# Patient Record
Sex: FEMALE | Race: WHITE | Employment: FULL TIME | URBAN - METROPOLITAN AREA
[De-identification: names, ages, dates, MRNs, and addresses within clinical notes are randomized per-mention and may not be internally consistent; named-entity substitution may affect disease eponyms.]

---

## 2023-01-28 ENCOUNTER — APPOINTMENT (OUTPATIENT)
Dept: ULTRASOUND IMAGING | Age: 43
DRG: 463 | End: 2023-01-28
Payer: COMMERCIAL

## 2023-01-28 ENCOUNTER — HOSPITAL ENCOUNTER (INPATIENT)
Age: 43
LOS: 1 days | Discharge: HOME | DRG: 463 | End: 2023-01-29
Attending: STUDENT IN AN ORGANIZED HEALTH CARE EDUCATION/TRAINING PROGRAM | Admitting: INTERNAL MEDICINE
Payer: COMMERCIAL

## 2023-01-28 ENCOUNTER — APPOINTMENT (OUTPATIENT)
Dept: CT IMAGING | Age: 43
DRG: 463 | End: 2023-01-28
Payer: COMMERCIAL

## 2023-01-28 DIAGNOSIS — N20.0 KIDNEY STONE: Primary | ICD-10-CM

## 2023-01-28 DIAGNOSIS — N12 PYELONEPHRITIS: ICD-10-CM

## 2023-01-28 LAB
A/G RATIO: 1.2 (ref 1.1–2.2)
ALBUMIN SERPL-MCNC: 4.1 G/DL (ref 3.4–5)
ALP BLD-CCNC: 90 U/L (ref 40–129)
ALT SERPL-CCNC: 9 U/L (ref 10–40)
AMPHETAMINE SCREEN, URINE: ABNORMAL
ANION GAP SERPL CALCULATED.3IONS-SCNC: 13 MMOL/L (ref 3–16)
AST SERPL-CCNC: 13 U/L (ref 15–37)
BACTERIA WET PREP: NORMAL
BACTERIA: ABNORMAL /HPF
BARBITURATE SCREEN URINE: ABNORMAL
BASOPHILS ABSOLUTE: 0.1 K/UL (ref 0–0.2)
BASOPHILS RELATIVE PERCENT: 0.9 %
BENZODIAZEPINE SCREEN, URINE: ABNORMAL
BILIRUB SERPL-MCNC: 0.4 MG/DL (ref 0–1)
BILIRUBIN URINE: NEGATIVE
BLOOD, URINE: ABNORMAL
BUN BLDV-MCNC: 10 MG/DL (ref 7–20)
CALCIUM SERPL-MCNC: 8.9 MG/DL (ref 8.3–10.6)
CANNABINOID SCREEN URINE: POSITIVE
CHLORIDE BLD-SCNC: 103 MMOL/L (ref 99–110)
CLARITY: CLEAR
CLUE CELLS: NORMAL
CO2: 20 MMOL/L (ref 21–32)
COCAINE METABOLITE SCREEN URINE: ABNORMAL
COLOR: YELLOW
CREAT SERPL-MCNC: 0.6 MG/DL (ref 0.6–1.1)
EOSINOPHILS ABSOLUTE: 0.3 K/UL (ref 0–0.6)
EOSINOPHILS RELATIVE PERCENT: 2.4 %
EPITHELIAL CELLS WET PREP: NORMAL
EPITHELIAL CELLS, UA: ABNORMAL /HPF (ref 0–5)
FENTANYL SCREEN, URINE: ABNORMAL
GFR SERPL CREATININE-BSD FRML MDRD: >60 ML/MIN/{1.73_M2}
GLUCOSE BLD-MCNC: 131 MG/DL (ref 70–99)
GLUCOSE URINE: NEGATIVE MG/DL
HCG QUALITATIVE: NEGATIVE
HCT VFR BLD CALC: 41 % (ref 36–48)
HEMOGLOBIN: 13.7 G/DL (ref 12–16)
INFLUENZA A: NOT DETECTED
INFLUENZA B: NOT DETECTED
KETONES, URINE: 15 MG/DL
LEUKOCYTE ESTERASE, URINE: ABNORMAL
LIPASE: 16 U/L (ref 13–60)
LYMPHOCYTES ABSOLUTE: 2.8 K/UL (ref 1–5.1)
LYMPHOCYTES RELATIVE PERCENT: 21.8 %
Lab: ABNORMAL
MAGNESIUM: 1.9 MG/DL (ref 1.8–2.4)
MCH RBC QN AUTO: 29.8 PG (ref 26–34)
MCHC RBC AUTO-ENTMCNC: 33.4 G/DL (ref 31–36)
MCV RBC AUTO: 89.2 FL (ref 80–100)
METHADONE SCREEN, URINE: ABNORMAL
MICROSCOPIC EXAMINATION: YES
MONOCYTES ABSOLUTE: 1 K/UL (ref 0–1.3)
MONOCYTES RELATIVE PERCENT: 8 %
NEUTROPHILS ABSOLUTE: 8.7 K/UL (ref 1.7–7.7)
NEUTROPHILS RELATIVE PERCENT: 66.9 %
NITRITE, URINE: POSITIVE
OPIATE SCREEN URINE: POSITIVE
OXYCODONE URINE: ABNORMAL
PDW BLD-RTO: 13.5 % (ref 12.4–15.4)
PH UA: 7.5
PH UA: 7.5 (ref 5–8)
PHENCYCLIDINE SCREEN URINE: ABNORMAL
PLATELET # BLD: 452 K/UL (ref 135–450)
PMV BLD AUTO: 7.9 FL (ref 5–10.5)
POTASSIUM REFLEX MAGNESIUM: 3.4 MMOL/L (ref 3.5–5.1)
PROTEIN UA: NEGATIVE MG/DL
RBC # BLD: 4.6 M/UL (ref 4–5.2)
RBC UA: ABNORMAL /HPF (ref 0–4)
RBC WET PREP: NORMAL
SARS-COV-2 RNA, RT PCR: NOT DETECTED
SODIUM BLD-SCNC: 136 MMOL/L (ref 136–145)
SOURCE WET PREP: NORMAL
SPECIFIC GRAVITY UA: 1.01 (ref 1–1.03)
TOTAL PROTEIN: 7.6 G/DL (ref 6.4–8.2)
TRICHOMONAS PREP: NORMAL
URINE REFLEX TO CULTURE: YES
URINE TYPE: ABNORMAL
UROBILINOGEN, URINE: 0.2 E.U./DL
WBC # BLD: 13 K/UL (ref 4–11)
WBC UA: ABNORMAL /HPF (ref 0–5)
WBC WET PREP: NORMAL
YEAST WET PREP: NORMAL

## 2023-01-28 PROCEDURE — 83690 ASSAY OF LIPASE: CPT

## 2023-01-28 PROCEDURE — 6360000004 HC RX CONTRAST MEDICATION: Performed by: PHYSICIAN ASSISTANT

## 2023-01-28 PROCEDURE — 87591 N.GONORRHOEAE DNA AMP PROB: CPT

## 2023-01-28 PROCEDURE — 87210 SMEAR WET MOUNT SALINE/INK: CPT

## 2023-01-28 PROCEDURE — 96375 TX/PRO/DX INJ NEW DRUG ADDON: CPT

## 2023-01-28 PROCEDURE — 83735 ASSAY OF MAGNESIUM: CPT

## 2023-01-28 PROCEDURE — 87086 URINE CULTURE/COLONY COUNT: CPT

## 2023-01-28 PROCEDURE — 76856 US EXAM PELVIC COMPLETE: CPT

## 2023-01-28 PROCEDURE — 81001 URINALYSIS AUTO W/SCOPE: CPT

## 2023-01-28 PROCEDURE — 87636 SARSCOV2 & INF A&B AMP PRB: CPT

## 2023-01-28 PROCEDURE — 6360000002 HC RX W HCPCS: Performed by: PHYSICIAN ASSISTANT

## 2023-01-28 PROCEDURE — 2580000003 HC RX 258: Performed by: PHYSICIAN ASSISTANT

## 2023-01-28 PROCEDURE — 85025 COMPLETE CBC W/AUTO DIFF WBC: CPT

## 2023-01-28 PROCEDURE — 80307 DRUG TEST PRSMV CHEM ANLYZR: CPT

## 2023-01-28 PROCEDURE — 1200000000 HC SEMI PRIVATE

## 2023-01-28 PROCEDURE — 99285 EMERGENCY DEPT VISIT HI MDM: CPT

## 2023-01-28 PROCEDURE — 2580000003 HC RX 258: Performed by: NURSE PRACTITIONER

## 2023-01-28 PROCEDURE — 74177 CT ABD & PELVIS W/CONTRAST: CPT

## 2023-01-28 PROCEDURE — 36415 COLL VENOUS BLD VENIPUNCTURE: CPT

## 2023-01-28 PROCEDURE — 87088 URINE BACTERIA CULTURE: CPT

## 2023-01-28 PROCEDURE — 6370000000 HC RX 637 (ALT 250 FOR IP): Performed by: PHYSICIAN ASSISTANT

## 2023-01-28 PROCEDURE — 96365 THER/PROPH/DIAG IV INF INIT: CPT

## 2023-01-28 PROCEDURE — 80053 COMPREHEN METABOLIC PANEL: CPT

## 2023-01-28 PROCEDURE — 84703 CHORIONIC GONADOTROPIN ASSAY: CPT

## 2023-01-28 PROCEDURE — 6360000002 HC RX W HCPCS: Performed by: NURSE PRACTITIONER

## 2023-01-28 PROCEDURE — 6370000000 HC RX 637 (ALT 250 FOR IP): Performed by: NURSE PRACTITIONER

## 2023-01-28 PROCEDURE — 87186 SC STD MICRODIL/AGAR DIL: CPT

## 2023-01-28 PROCEDURE — 87491 CHLMYD TRACH DNA AMP PROBE: CPT

## 2023-01-28 RX ORDER — POLYETHYLENE GLYCOL 3350 17 G/17G
17 POWDER, FOR SOLUTION ORAL DAILY PRN
Status: DISCONTINUED | OUTPATIENT
Start: 2023-01-28 | End: 2023-01-29 | Stop reason: HOSPADM

## 2023-01-28 RX ORDER — SODIUM CHLORIDE 9 MG/ML
INJECTION, SOLUTION INTRAVENOUS PRN
Status: DISCONTINUED | OUTPATIENT
Start: 2023-01-28 | End: 2023-01-29 | Stop reason: HOSPADM

## 2023-01-28 RX ORDER — POTASSIUM CHLORIDE 20 MEQ/1
40 TABLET, EXTENDED RELEASE ORAL ONCE
Status: COMPLETED | OUTPATIENT
Start: 2023-01-28 | End: 2023-01-28

## 2023-01-28 RX ORDER — SODIUM CHLORIDE 9 MG/ML
INJECTION, SOLUTION INTRAVENOUS CONTINUOUS
Status: DISCONTINUED | OUTPATIENT
Start: 2023-01-28 | End: 2023-01-29 | Stop reason: HOSPADM

## 2023-01-28 RX ORDER — ONDANSETRON 2 MG/ML
4 INJECTION INTRAMUSCULAR; INTRAVENOUS ONCE
Status: COMPLETED | OUTPATIENT
Start: 2023-01-28 | End: 2023-01-28

## 2023-01-28 RX ORDER — FLUOXETINE HYDROCHLORIDE 20 MG/1
40 CAPSULE ORAL DAILY
Status: DISCONTINUED | OUTPATIENT
Start: 2023-01-28 | End: 2023-01-29 | Stop reason: HOSPADM

## 2023-01-28 RX ORDER — TAMSULOSIN HYDROCHLORIDE 0.4 MG/1
0.4 CAPSULE ORAL DAILY
Status: DISCONTINUED | OUTPATIENT
Start: 2023-01-29 | End: 2023-01-29 | Stop reason: HOSPADM

## 2023-01-28 RX ORDER — KETOROLAC TROMETHAMINE 30 MG/ML
15 INJECTION, SOLUTION INTRAMUSCULAR; INTRAVENOUS ONCE
Status: COMPLETED | OUTPATIENT
Start: 2023-01-28 | End: 2023-01-28

## 2023-01-28 RX ORDER — SODIUM CHLORIDE 0.9 % (FLUSH) 0.9 %
5-40 SYRINGE (ML) INJECTION EVERY 12 HOURS SCHEDULED
Status: DISCONTINUED | OUTPATIENT
Start: 2023-01-28 | End: 2023-01-29 | Stop reason: HOSPADM

## 2023-01-28 RX ORDER — 0.9 % SODIUM CHLORIDE 0.9 %
500 INTRAVENOUS SOLUTION INTRAVENOUS ONCE
Status: COMPLETED | OUTPATIENT
Start: 2023-01-28 | End: 2023-01-28

## 2023-01-28 RX ORDER — ACETAMINOPHEN 650 MG/1
650 SUPPOSITORY RECTAL EVERY 6 HOURS PRN
Status: DISCONTINUED | OUTPATIENT
Start: 2023-01-28 | End: 2023-01-29 | Stop reason: HOSPADM

## 2023-01-28 RX ORDER — ENOXAPARIN SODIUM 100 MG/ML
40 INJECTION SUBCUTANEOUS DAILY
Status: DISCONTINUED | OUTPATIENT
Start: 2023-01-28 | End: 2023-01-29 | Stop reason: HOSPADM

## 2023-01-28 RX ORDER — ONDANSETRON 4 MG/1
4 TABLET, ORALLY DISINTEGRATING ORAL EVERY 8 HOURS PRN
Status: DISCONTINUED | OUTPATIENT
Start: 2023-01-28 | End: 2023-01-29 | Stop reason: HOSPADM

## 2023-01-28 RX ORDER — MORPHINE SULFATE 4 MG/ML
4 INJECTION, SOLUTION INTRAMUSCULAR; INTRAVENOUS ONCE
Status: COMPLETED | OUTPATIENT
Start: 2023-01-28 | End: 2023-01-28

## 2023-01-28 RX ORDER — FLUOXETINE HYDROCHLORIDE 40 MG/1
40 CAPSULE ORAL DAILY
COMMUNITY

## 2023-01-28 RX ORDER — ACETAMINOPHEN 325 MG/1
650 TABLET ORAL EVERY 6 HOURS PRN
Status: DISCONTINUED | OUTPATIENT
Start: 2023-01-28 | End: 2023-01-29 | Stop reason: HOSPADM

## 2023-01-28 RX ORDER — SODIUM CHLORIDE 0.9 % (FLUSH) 0.9 %
5-40 SYRINGE (ML) INJECTION PRN
Status: DISCONTINUED | OUTPATIENT
Start: 2023-01-28 | End: 2023-01-29 | Stop reason: HOSPADM

## 2023-01-28 RX ORDER — ONDANSETRON 2 MG/ML
4 INJECTION INTRAMUSCULAR; INTRAVENOUS EVERY 6 HOURS PRN
Status: DISCONTINUED | OUTPATIENT
Start: 2023-01-28 | End: 2023-01-29 | Stop reason: HOSPADM

## 2023-01-28 RX ORDER — PROCHLORPERAZINE EDISYLATE 5 MG/ML
10 INJECTION INTRAMUSCULAR; INTRAVENOUS ONCE
Status: DISCONTINUED | OUTPATIENT
Start: 2023-01-28 | End: 2023-01-28

## 2023-01-28 RX ORDER — DIPHENHYDRAMINE HYDROCHLORIDE 50 MG/ML
25 INJECTION INTRAMUSCULAR; INTRAVENOUS ONCE
Status: COMPLETED | OUTPATIENT
Start: 2023-01-28 | End: 2023-01-28

## 2023-01-28 RX ORDER — DROPERIDOL 2.5 MG/ML
0.62 INJECTION, SOLUTION INTRAMUSCULAR; INTRAVENOUS EVERY 6 HOURS PRN
Status: DISCONTINUED | OUTPATIENT
Start: 2023-01-28 | End: 2023-01-28

## 2023-01-28 RX ADMIN — KETOROLAC TROMETHAMINE 15 MG: 30 INJECTION, SOLUTION INTRAMUSCULAR; INTRAVENOUS at 14:39

## 2023-01-28 RX ADMIN — ENOXAPARIN SODIUM 40 MG: 100 INJECTION SUBCUTANEOUS at 18:19

## 2023-01-28 RX ADMIN — FLUOXETINE 40 MG: 20 CAPSULE ORAL at 18:19

## 2023-01-28 RX ADMIN — IOPAMIDOL 75 ML: 755 INJECTION, SOLUTION INTRAVENOUS at 12:46

## 2023-01-28 RX ADMIN — DIPHENHYDRAMINE HYDROCHLORIDE 25 MG: 50 INJECTION, SOLUTION INTRAMUSCULAR; INTRAVENOUS at 12:41

## 2023-01-28 RX ADMIN — MORPHINE SULFATE 4 MG: 4 INJECTION INTRAVENOUS at 11:25

## 2023-01-28 RX ADMIN — SODIUM CHLORIDE 500 ML: 9 INJECTION, SOLUTION INTRAVENOUS at 11:24

## 2023-01-28 RX ADMIN — CEFTRIAXONE SODIUM 1000 MG: 1 INJECTION, POWDER, FOR SOLUTION INTRAMUSCULAR; INTRAVENOUS at 13:52

## 2023-01-28 RX ADMIN — DROPERIDOL 0.62 MG: 2.5 INJECTION, SOLUTION INTRAMUSCULAR; INTRAVENOUS at 12:41

## 2023-01-28 RX ADMIN — POTASSIUM CHLORIDE 40 MEQ: 1500 TABLET, EXTENDED RELEASE ORAL at 12:12

## 2023-01-28 RX ADMIN — SODIUM CHLORIDE: 9 INJECTION, SOLUTION INTRAVENOUS at 18:19

## 2023-01-28 RX ADMIN — ONDANSETRON HYDROCHLORIDE 4 MG: 2 INJECTION, SOLUTION INTRAMUSCULAR; INTRAVENOUS at 11:25

## 2023-01-28 ASSESSMENT — PAIN SCALES - GENERAL
PAINLEVEL_OUTOF10: 0
PAINLEVEL_OUTOF10: 0
PAINLEVEL_OUTOF10: 10
PAINLEVEL_OUTOF10: 4

## 2023-01-28 ASSESSMENT — LIFESTYLE VARIABLES
HOW MANY STANDARD DRINKS CONTAINING ALCOHOL DO YOU HAVE ON A TYPICAL DAY: PATIENT DOES NOT DRINK
HOW OFTEN DO YOU HAVE A DRINK CONTAINING ALCOHOL: NEVER

## 2023-01-28 ASSESSMENT — ENCOUNTER SYMPTOMS
DIARRHEA: 1
RESPIRATORY NEGATIVE: 1
ABDOMINAL PAIN: 1

## 2023-01-28 ASSESSMENT — PAIN DESCRIPTION - LOCATION: LOCATION: ABDOMEN

## 2023-01-28 ASSESSMENT — PAIN - FUNCTIONAL ASSESSMENT: PAIN_FUNCTIONAL_ASSESSMENT: 0-10

## 2023-01-28 NOTE — ED NOTES
Dr. Reji Bang called back and spoke to [address] [facility],[address]  [birth year] [phone]

## 2023-01-28 NOTE — ED PROVIDER NOTES
I independently examined and evaluated Josee Avitia. I personally saw the patient and performed a substantive portion of the visit including all aspects of the medical decision making. In brief, this 56-year-old female presenting with 1 hour of left-sided abdominal/flank pain. Endorses nausea with several episodes of nonbloody nonbilious emesis. Denies any fevers or chills. Endorses loose stools. Has not noticed any dysuria or hematuria. .    Focused exam revealed   General: Alert, no acute distress, patient resting comfortably   Skin: warm, intact, no pallor noted   Head: Normocephalic, atraumatic   Eye: Normal conjunctiva   Cardiac: Normal peripheral perfusion  Respiratory: No acute distress   Musculoskeletal: No deformity, full ROM. Neurological: alert and oriented, normal sensory and motor observed. Psychiatric: Cooperative    ED course: Blood work shows mild leukocytosis and mild metabolic acidosis. Urinalysis does show a large amount of blood as well as nitrites. Patient treated with ceftriaxone. CT abdomen pelvis does show left-sided kidney stone associated with this urinary tract infection. Also shows possible epiploic appendagitis or diverticulitis. Patient discussed with urology and general surgery by the [facility]. Patient be admitted to hospital service for continued treatment. All diagnostic, treatment, and disposition decisions were made by myself in conjunction with the advanced practice provider. For all further details of the patient's emergency department visit, please see the advanced practice provider's documentation. Comment: Please note this report has been produced using speech recognition software and may contain errors related to that system including errors in grammar, punctuation, and spelling, as well as words and phrases that may be inappropriate. If there are any questions or concerns please feel free to contact the dictating provider for clarification. Brian Gardner, DO  01/28/23 1632

## 2023-01-28 NOTE — ED PROVIDER NOTES
[address] ED  EMERGENCY DEPARTMENT ENCOUNTER        Pt Name: Scooter Chow  MRN: [id number]  [place] 1980  Date of evaluation: 1/28/2023  Provider: Jenae Bradford PA-C  PCP: YONATAN Zapien CNP  Note Started: 11:03 AM EST 1/28/23       I have seen and evaluated this patient with my supervising physician Roena Kanner, [id number]  [address]       Chief Complaint   Patient presents with    Abdominal Pain     Sudden onset of left side abd pain. HISTORY OF PRESENT ILLNESS: 1 or more Elements     History From: patient      Scooter Chow is a 40 y.o. female with a past medical history of depression migraines hyperlipidemia vitamin D deficiency tobacco use obesity brought in today by EMS with complaints of left lower quadrant abdominal pain. Onset started 1 hour prior to arrival to the ED. Duration of symptoms have been persistent since onset. Context includes left lower quadrant pain. She denies fevers or chills. Denies nausea or vomiting but does endorse some watery loose stool. Denies any blood in the stool. Denies chest pain or shortness of breath. Denies urinary complaints. States she is on her menstrual cycle. Denies vaginal discharge or new sexual partners. Denies concern for STD. Denies sick contacts or recent abdominal surgeries. She has not taken anything for pain at this time. Denies any urinary complaints. Rates her current pain 10 out of 10 no radiation. No aggravating symptoms. No alleviating symptoms. Otherwise denies any other complaints. Nothing seems to make symptoms better or worse. He states she does smoke marijuana. Denies alcohol use. Nursing Notes were all reviewed and agreed with or any disagreements were addressed in the HPI. REVIEW OF SYSTEMS :      Review of Systems   Constitutional: Negative. HENT: Negative. Respiratory: Negative. Cardiovascular: Negative.     Gastrointestinal:  Positive for abdominal pain and diarrhea. Genitourinary: Negative. Musculoskeletal: Negative. Skin: Negative. Neurological: Negative. Positives and Pertinent negatives as per HPI. SURGICAL HISTORY     Past Surgical History:   Procedure Laterality Date    APPENDECTOMY  2007     SECTION  ,     WISDOM TOOTH EXTRACTION  2013       CURRENTMEDICATIONS       Previous Medications    BUPROPION SR (WELLBUTRIN SR) 150 MG SR TABLET    Take 1 tablet by mouth 2 times daily. CHANTIX CONTINUING MONTH JONATHAN 1 MG TABLET    Take 1 tablet by mouth 2 times daily. FLUOXETINE (PROZAC) 40 MG CAPSULE    Take 40 mg by mouth daily    VITAMIN D (ERGOCALCIFEROL) 97613 UNITS CAPS CAPSULE    Take 1 capsule by mouth twice a week. ALLERGIES     Patient has no known allergies. FAMILYHISTORY       Family History   Problem Relation Age of Onset    Depression Mother     High Blood Pressure Mother     Other Mother         Lupus    Other Mother         GERD    Anxiety Disorder Mother     Depression Brother 25    Alcohol Abuse Brother 25    Anxiety Disorder Brother     Arthritis Maternal Grandmother     Cancer Maternal Grandmother 62         of Pancreatic Cancer    Arthritis Maternal Grandfather     Lung Cancer Maternal Grandfather 72         of Lung Cancer    Alcohol Abuse Maternal Grandfather     Arthritis Paternal Grandmother     Arthritis Paternal Grandfather     Lung Cancer Paternal Grandfather 76         of Lung Cancer    Learning Disabilities Brother 5    Anxiety Disorder Father     Depression Father         SOCIAL HISTORY       Social History     Tobacco Use    Smoking status: Some Days     Packs/day: 0.00     Years: 5.00     Pack years: 0.00     Types: Cigarettes    Smokeless tobacco: Never    Tobacco comments:     Smoking 5 cigarettes per day.  Quit Date (14)   Substance Use Topics    Alcohol use: No     Comment: no    Drug use: No     Comment: never       SCREENINGS        Bybee Coma Scale  Eye Opening: Spontaneous  Best Verbal Response: Oriented  Best Motor Response: Obeys commands  Bryan Coma Scale Score: 15                CIWA Assessment  BP: 135/86  Heart Rate: 67           PHYSICAL EXAM  1 or more Elements     ED Triage Vitals   BP Temp Temp src Pulse Resp SpO2 Height Weight   -- -- -- -- -- -- -- --       Physical Exam  Vitals and nursing note reviewed. Constitutional:       General: She is awake. She is not in acute distress. Appearance: Normal appearance. She is well-developed. She is obese. She is not ill-appearing, toxic-appearing or diaphoretic. HENT:      Head: Normocephalic and atraumatic. Nose: Nose normal.   Eyes:      General:         Right eye: No discharge. Left eye: No discharge. Cardiovascular:      Rate and Rhythm: Normal rate and regular rhythm. Pulses:           Radial pulses are 2+ on the right side and 2+ on the left side. Heart sounds: Normal heart sounds. No murmur heard. No gallop. Pulmonary:      Effort: Pulmonary effort is normal. No respiratory distress. Breath sounds: Normal breath sounds. No decreased breath sounds, wheezing, rhonchi or rales. Chest:      Chest wall: No tenderness. Abdominal:      General: Abdomen is flat. Bowel sounds are normal.      Palpations: Abdomen is soft. Tenderness: There is abdominal tenderness in the left lower quadrant. There is guarding. There is no right CVA tenderness, left CVA tenderness or rebound. Negative signs include Cook's sign and McBurney's sign. Musculoskeletal:         General: No deformity. Normal range of motion. Cervical back: Normal range of motion and neck supple. Right lower leg: No edema. Left lower leg: No edema. Skin:     General: Skin is warm and dry. Neurological:      Mental Status: She is alert and oriented to person, place, and time. Psychiatric:         Behavior: Behavior normal. Behavior is cooperative.            DIAGNOSTIC RESULTS LABS:    Labs Reviewed   CBC WITH AUTO DIFFERENTIAL - Abnormal; Notable for the following components:       Result Value    WBC 13.0 (*)     Platelets 755 (*)     Neutrophils Absolute 8.7 (*)     All other components within normal limits   COMPREHENSIVE METABOLIC PANEL W/ REFLEX TO MG FOR LOW K - Abnormal; Notable for the following components:    Potassium reflex Magnesium 3.4 (*)     CO2 20 (*)     Glucose 131 (*)     ALT 9 (*)     AST 13 (*)     All other components within normal limits   URINALYSIS WITH REFLEX TO CULTURE - Abnormal; Notable for the following components:    Ketones, Urine 15 (*)     Blood, Urine LARGE (*)     Nitrite, Urine POSITIVE (*)     Leukocyte Esterase, Urine TRACE (*)     All other components within normal limits   URINE DRUG SCREEN - Abnormal; Notable for the following components:    Cannabinoid Scrn, Ur POSITIVE (*)     Opiate Scrn, Ur POSITIVE (*)     All other components within normal limits   MICROSCOPIC URINALYSIS - Abnormal; Notable for the following components:    WBC, UA 10-20 (*)     RBC, UA  (*)     Epithelial Cells, UA 11-20 (*)     Bacteria, UA 2+ (*)     All other components within normal limits   COVID-19 & INFLUENZA COMBO   WET PREP, GENITAL   C.TRACHOMATIS N.GONORRHOEAE DNA   CULTURE, URINE   LIPASE   HCG, SERUM, QUALITATIVE   MAGNESIUM       When ordered only abnormal lab results are displayed. All other labs were within normal range or not returned as of this dictation. EKG: When ordered, EKG's are interpreted by the Emergency Department Physician in the absence of a cardiologist.  Please see their note for interpretation of EKG.     RADIOLOGY:   Non-plain film images such as CT, Ultrasound and MRI are read by the radiologist. Plain radiographic images are visualized and preliminarily interpreted by the ED Provider with the below findings:        Interpretation per the Radiologist below, if available at the time of this note:    Sharon Additional Contrast? None   Preliminary Result   1. 3 mm calculus in the proximal left ureter with mild upstream   hydronephrosis. Subtle area of hypoenhancement in the lower pole of the left   kidney which could reflect an underlying cyst however focal pyelonephritis is   not excluded. 2. 9 mm fat-containing lesion in the left lower quadrant anterior to the   proximal sigmoid colon with surrounding inflammatory changes most suggestive   of epiploic appendagitis. Differential considerations include omental   infarct. Less likely consideration includes an acute uncomplicated   diverticulitis. 3. 2.0 cm dominant follicle in the left ovary. US PELVIS COMPLETE NON-OB TRANSABDOMINAL AND TRANSVAGINAL   Preliminary Result   Unremarkable uterus. Nonvisualization of the ovaries. No results found. No results found. PROCEDURES   Unless otherwise noted below, none     Procedures    CRITICAL CARE TIME (.cctime)       PAST MEDICAL HISTORY      has a past medical history of Depression, History of anemia (age 20's), Hyperlipidemia (9/13), Migraines, Overweight(278.02), and Vitamin D deficiency (9/13).      EMERGENCY DEPARTMENT COURSE and DIFFERENTIAL DIAGNOSIS/MDM:   Vitals:    Vitals:    01/28/23 1132 01/28/23 1307 01/28/23 1357 01/28/23 1358   BP: (!) 151/93  135/86    Pulse:    67   Resp:    16   Temp:       TempSrc:       SpO2: 100% 100% 100%    Weight:       Height:           Patient was given the following medications:  Medications   droperidol (INAPSINE) injection 0.625 mg (0.625 mg IntraVENous Given 1/28/23 1241)   cefTRIAXone (ROCEPHIN) 1,000 mg in sodium chloride 0.9 % 50 mL IVPB (mini-bag) (1,000 mg IntraVENous New Bag 1/28/23 1352)   ketorolac (TORADOL) injection 15 mg (has no administration in time range)   0.9 % sodium chloride bolus (0 mLs IntraVENous Stopped 1/28/23 1321)   morphine sulfate (PF) injection 4 mg (4 mg IntraVENous Given 1/28/23 1125)   ondansetron (ZOFRAN) injection 4 mg (4 mg IntraVENous Given 1/28/23 1125)   potassium chloride (KLOR-CON M) extended release tablet 40 mEq (40 mEq Oral Given 1/28/23 1212)   diphenhydrAMINE (BENADRYL) injection 25 mg (25 mg IntraVENous Given 1/28/23 1241)   iopamidol (ISOVUE-370) 76 % injection 75 mL (75 mLs IntraVENous Given 1/28/23 1246)             Is this patient to be included in the SEP-1 Core Measure due to severe sepsis or septic shock? No   Exclusion criteria - the patient is NOT to be included for SEP-1 Core Measure due to:  May have criteria for sepsis, but does not meet criteria for severe sepsis or septic shock    Chronic Conditions affecting care:    has a past medical history of Depression, History of anemia (age 20's), Hyperlipidemia (9/13), Migraines, Overweight(278.02), and Vitamin D deficiency (9/13). CONSULTS: (Who and What was discussed)  IP CONSULT TO UROLOGY to discuss admission, Dr. Carmen Cabrera to discuss CT scan results         Records Reviewed (Source): NONE    CC/HPI Summary, DDx, ED Course, and Reassessment:     Patient brought in today by EMS with complaints of left lower quadrant abdominal pain. On exam she appears uncomfortable and in pain. She is alert oriented afebrile breathing on room air satting at 100%. Nontoxic. No acute respiratory distress. Old labs records reviewed. Patient seen and evaluated by myself as well as my attending Dr. Nano Mccray. Patient given IV fluids, IV morphine and IV Zofran. CBC shows white count of 13. Hemoglobin 13.7. Elevated platelets to 787. Potassium of 3.4. Will supplement with p.o. potassium. Kidney function liver function unremarkable. Lipase of 16. HGG qualitative is negative. COVID and FLU negative. Patient received p.o. potassium. No other electrolyte abnormalities. Ultrasound of the pelvis which is read and interpreted by radiology reveals unremarkable uterus nonvisualization of the ovaries.     Urine positive nitrites trace leukocytes 15 ketones and a 20 WBCs with +2 bacteria. We will plan to treat for UTI. Will give IV Rocephin. Wet  Prep negative. Gonorrhea and chlamydia currently pending. CT abdomen and pelvis shows a 3 mm calculus in the proximal left ureter with mild upstream hydronephrosis subtle area of hypoenhancement in the lower pole of the left kidney which could reflect an underlying cyst however focal pyelonephritis is not excluded. 9 mm fat-containing lesion in the left lower quadrant anterior to the proximal sigmoid colon with surrounding inflammatory changes no suggestive of epileptic appendagitis differentials include omental infarct less likely consideration includes an acute uncomplicated diverticulitis 2 cm dominant follicle in the left ovary. Patient was updated on all findings. Plan at this time will be to admit. We will consult urology and general surgery as well as the hospitalist.    I spoke to general surgery at the request of the hospitalist in regards to CT findings. I spoke to Dr. Waylon Pacheco in regards to the  epiploic appendagitis did not feel as though this was a surgical finding. In addition we will consult urology. Disposition Considerations (tests considered but not done, Admit vs D/C, Shared Decision Making, Pt Expectation of Test or Tx.):     Plan at this time will be to admit for infected kidney stone. I spoke to Neva Kayser APP with the hospitalist team who did accept this admission. Patient stable at time of admission. I am the Primary Clinician of Record. FINAL IMPRESSION      1. Kidney stone    2. Pyelonephritis          DISPOSITION/PLAN     DISPOSITION Decision To Admit 01/28/2023 02:07:49 PM      PATIENT REFERRED TO:  No follow-up provider specified.     DISCHARGE MEDICATIONS:  New Prescriptions    No medications on file       DISCONTINUED MEDICATIONS:  Discontinued Medications    No medications on file              (Please note that portions of this note were completed with a voice recognition program.  Efforts were made to edit the dictations but occasionally words are mis-transcribed.)    David Shin PA-C (electronically signed)        David Shin PA-C  01/28/23 4110

## 2023-01-28 NOTE — PROGRESS NOTES
Admit to med-surg  3 mm ureteral stone  Pyelonephritis    Gavi Schwartz [facility]  1/28/2023

## 2023-01-28 NOTE — ED NOTES
Writer gave report to [employer], who assumes care at this time. Abebe Morrison RN will transport pt to PCU via wheelchair.       Zaki Gar RN  01/28/23 0746

## 2023-01-29 VITALS
TEMPERATURE: 98 F | HEART RATE: 81 BPM | SYSTOLIC BLOOD PRESSURE: 122 MMHG | BODY MASS INDEX: 30.23 KG/M2 | WEIGHT: 199.5 LBS | DIASTOLIC BLOOD PRESSURE: 74 MMHG | RESPIRATION RATE: 18 BRPM | HEIGHT: 68 IN | OXYGEN SATURATION: 94 %

## 2023-01-29 PROBLEM — K63.89 EPIPLOIC APPENDAGITIS: Status: ACTIVE | Noted: 2023-01-29

## 2023-01-29 PROBLEM — D72.829 LEUCOCYTOSIS: Status: ACTIVE | Noted: 2023-01-29

## 2023-01-29 PROBLEM — E87.6 HYPOKALEMIA: Status: ACTIVE | Noted: 2023-01-29

## 2023-01-29 LAB
A/G RATIO: 1.2 (ref 1.1–2.2)
ALBUMIN SERPL-MCNC: 3.4 G/DL (ref 3.4–5)
ALP BLD-CCNC: 85 U/L (ref 40–129)
ALT SERPL-CCNC: 13 U/L (ref 10–40)
ANION GAP SERPL CALCULATED.3IONS-SCNC: 9 MMOL/L (ref 3–16)
AST SERPL-CCNC: 11 U/L (ref 15–37)
BASOPHILS ABSOLUTE: 0 K/UL (ref 0–0.2)
BASOPHILS RELATIVE PERCENT: 0.4 %
BILIRUB SERPL-MCNC: 0.4 MG/DL (ref 0–1)
BUN BLDV-MCNC: 11 MG/DL (ref 7–20)
CALCIUM SERPL-MCNC: 8 MG/DL (ref 8.3–10.6)
CHLORIDE BLD-SCNC: 107 MMOL/L (ref 99–110)
CO2: 21 MMOL/L (ref 21–32)
CREAT SERPL-MCNC: 0.6 MG/DL (ref 0.6–1.1)
EOSINOPHILS ABSOLUTE: 0.2 K/UL (ref 0–0.6)
EOSINOPHILS RELATIVE PERCENT: 2.2 %
GFR SERPL CREATININE-BSD FRML MDRD: >60 ML/MIN/{1.73_M2}
GLUCOSE BLD-MCNC: 96 MG/DL (ref 70–99)
HCT VFR BLD CALC: 35.2 % (ref 36–48)
HEMOGLOBIN: 12.1 G/DL (ref 12–16)
LYMPHOCYTES ABSOLUTE: 2.2 K/UL (ref 1–5.1)
LYMPHOCYTES RELATIVE PERCENT: 20.6 %
MCH RBC QN AUTO: 29.9 PG (ref 26–34)
MCHC RBC AUTO-ENTMCNC: 34.3 G/DL (ref 31–36)
MCV RBC AUTO: 87.2 FL (ref 80–100)
MONOCYTES ABSOLUTE: 1 K/UL (ref 0–1.3)
MONOCYTES RELATIVE PERCENT: 9.3 %
NEUTROPHILS ABSOLUTE: 7.1 K/UL (ref 1.7–7.7)
NEUTROPHILS RELATIVE PERCENT: 67.5 %
PDW BLD-RTO: 13.3 % (ref 12.4–15.4)
PLATELET # BLD: 374 K/UL (ref 135–450)
PMV BLD AUTO: 7.9 FL (ref 5–10.5)
POTASSIUM REFLEX MAGNESIUM: 3.8 MMOL/L (ref 3.5–5.1)
RBC # BLD: 4.04 M/UL (ref 4–5.2)
SODIUM BLD-SCNC: 137 MMOL/L (ref 136–145)
TOTAL PROTEIN: 6.2 G/DL (ref 6.4–8.2)
WBC # BLD: 10.5 K/UL (ref 4–11)

## 2023-01-29 PROCEDURE — 6360000002 HC RX W HCPCS: Performed by: NURSE PRACTITIONER

## 2023-01-29 PROCEDURE — 2580000003 HC RX 258: Performed by: NURSE PRACTITIONER

## 2023-01-29 PROCEDURE — 6370000000 HC RX 637 (ALT 250 FOR IP): Performed by: UROLOGY

## 2023-01-29 PROCEDURE — 85025 COMPLETE CBC W/AUTO DIFF WBC: CPT

## 2023-01-29 PROCEDURE — 80053 COMPREHEN METABOLIC PANEL: CPT

## 2023-01-29 PROCEDURE — 36415 COLL VENOUS BLD VENIPUNCTURE: CPT

## 2023-01-29 PROCEDURE — 6370000000 HC RX 637 (ALT 250 FOR IP): Performed by: NURSE PRACTITIONER

## 2023-01-29 PROCEDURE — 99238 HOSP IP/OBS DSCHRG MGMT 30/<: CPT | Performed by: INTERNAL MEDICINE

## 2023-01-29 RX ORDER — TAMSULOSIN HYDROCHLORIDE 0.4 MG/1
0.4 CAPSULE ORAL DAILY
Qty: 30 CAPSULE | Refills: 3 | Status: SHIPPED | OUTPATIENT
Start: 2023-01-30

## 2023-01-29 RX ORDER — CEFUROXIME AXETIL 500 MG/1
500 TABLET ORAL 2 TIMES DAILY
Qty: 14 TABLET | Refills: 0 | Status: SHIPPED | OUTPATIENT
Start: 2023-01-29 | End: 2023-02-05

## 2023-01-29 RX ADMIN — TAMSULOSIN HYDROCHLORIDE 0.4 MG: 0.4 CAPSULE ORAL at 00:22

## 2023-01-29 RX ADMIN — FLUOXETINE 40 MG: 20 CAPSULE ORAL at 13:24

## 2023-01-29 RX ADMIN — SODIUM CHLORIDE, PRESERVATIVE FREE 10 ML: 5 INJECTION INTRAVENOUS at 13:34

## 2023-01-29 RX ADMIN — CEFTRIAXONE SODIUM 1000 MG: 1 INJECTION, POWDER, FOR SOLUTION INTRAMUSCULAR; INTRAVENOUS at 15:06

## 2023-01-29 RX ADMIN — SODIUM CHLORIDE: 9 INJECTION, SOLUTION INTRAVENOUS at 06:09

## 2023-01-29 ASSESSMENT — PAIN SCALES - GENERAL
PAINLEVEL_OUTOF10: 0

## 2023-01-29 NOTE — FLOWSHEET NOTE
01/28/23 1952   Vital Signs   Temp 98.1 °F (36.7 °C)   Temp Source Oral   Heart Rate 69   Heart Rate Source Monitor   Resp 16   /74   MAP (Calculated) 87   BP Location Left upper arm   Level of Consciousness 0   MEWS Score 1   Oxygen Therapy   SpO2 99 %   O2 Device None (Room air)   Pt assessment complete. Pt lying in bed quietly. No s/s of distress noted. Lung sounds clear. Pt denies any pain at this time. IV fluids infusing at 75ml/hr. Denies needs. Call light within reach.

## 2023-01-29 NOTE — CONSULTS
Patient:  Bill Schuler  YOB: 1980   CSN:  [id number]    Primary Care Provider:  Marylee Newer, APRN - CNP       Urology Attending Consult Note     Reason for Consultation:  nephrolithiasis, flank pain    Chief Complaint: \"my side hurt\"    HPI:  Rebecca Henry is a 40 y.o. female who presented with 1 day history of severe renal colic which prompted visit to the ER. CT showed a 3mm stone,  right upper ureter. No fevers but possible cystitis. He feels well now and prefers to go home to try to pass the stone.      Past Medical History:   Diagnosis Date    Depression     History of anemia age 20's    Hyperlipidemia 2013    Kidney stone 2023    Migraines     Overweight(278.02)     weight is 188 lbs (), goal = 159 lbs    Vitamin D deficiency 2013       Past Surgical History:   Procedure Laterality Date    APPENDECTOMY  2007     SECTION  ,     WISDOM TOOTH EXTRACTION  2013       Medication List reviewed:     Current Facility-Administered Medications   Medication Dose Route Frequency Provider Last Rate Last Admin    FLUoxetine (PROZAC) capsule 40 mg  40 mg Oral Daily YONATAN Sifuentes - CNP   40 mg at 23 1324    sodium chloride flush 0.9 % injection 5-40 mL  5-40 mL IntraVENous 2 times per day YONATAN Sifuentes CNP   10 mL at 23 1334    sodium chloride flush 0.9 % injection 5-40 mL  5-40 mL IntraVENous PRN YONATAN Sifuentes - CNP        0.9 % sodium chloride infusion   IntraVENous PRN YONATAN Sifuentes CNP        enoxaparin (LOVENOX) injection 40 mg  40 mg SubCUTAneous Daily YONATAN Sifuentes - CNP   40 mg at 23 181    ondansetron (ZOFRAN-ODT) disintegrating tablet 4 mg  4 mg Oral Q8H PRN YONATAN Sifuentes CNP        Or    ondansetron (ZOFRAN) injection 4 mg  4 mg IntraVENous Q6H PRN YONATAN Sifuentes CNP        polyethylene glycol (GLYCOLAX) packet 17 g  17 g Oral Daily PRN YONATAN Sifuentes CNP        acetaminophen (TYLENOL) tablet 650 mg  650 mg Oral Q6H PRN Buzzy Cornea, APRN - CNP        Or    acetaminophen (TYLENOL) suppository 650 mg  650 mg Rectal Q6H PRN Buzzy Cornea, APRN - CNP        0.9 % sodium chloride infusion   IntraVENous Continuous Buzzy Cornea, APRN - CNP 75 mL/hr at 23 4793 New Bag at 23 0609    cefTRIAXone (ROCEPHIN) 1,000 mg in sodium chloride 0.9 % 50 mL IVPB (mini-bag)  1,000 mg IntraVENous Q24H Buzzy Cornea, APRN - CNP   Stopped at 23 1639    HYDROmorphone (DILAUDID) injection 0.5 mg  0.5 mg IntraVENous Q4H PRN Buzzy Cornea, APRN - CNP        tamsulosin [facility]) capsule 0.4 mg  0.4 mg Oral Daily Manasa Wu MD   0.4 mg at 23 0022       No Known Allergies    Family History   Problem Relation Age of Onset    Depression Mother     High Blood Pressure Mother     Other Mother         Lupus    Other Mother         GERD    Anxiety Disorder Mother     Depression Brother 25    Alcohol Abuse Brother 25    Anxiety Disorder Brother     Arthritis Maternal Grandmother     Cancer Maternal Grandmother 62         of Pancreatic Cancer    Arthritis Maternal Grandfather     Lung Cancer Maternal Grandfather 72         of Lung Cancer    Alcohol Abuse Maternal Grandfather     Arthritis Paternal Grandmother     Arthritis Paternal Grandfather     Lung Cancer Paternal Grandfather 76         of Lung Cancer    Learning Disabilities Brother 5    Anxiety Disorder Father     Depression Father        Social History     Tobacco Use    Smoking status: Some Days     Packs/day: 0.00     Years: 5.00     Pack years: 0.00     Types: Cigarettes    Smokeless tobacco: Never    Tobacco comments:     Smoking 5 cigarettes per day. Quit Date (14)   Substance Use Topics    Alcohol use: No     Comment: no    Drug use: No     Comment: never         Review of Systems: A 12 point ROS was performed and was unremarkable unless listed in the history of present illness. I/O last 3 completed shifts:   In: 904.1 [I.V.:854.7; IV Piggyback:49.4]  Out: 200 [Urine:200]    Physical Exam:  Patient Vitals for the past 8 hrs:   BP Temp Temp src Pulse Resp SpO2   01/29/23 1315 122/74 98 °F (36.7 °C) Oral 81 18 94 %     Constitutional: pleasant patient in NAD, with a normal body habitus   EENT: pink conjunctivae, lips without cyanosis, normal appearance of ears and nose  Neck: no masses or lesions, trachea midline   Respiratory: normal respiratory movements without distress   Cardiovascular: regular rate and rhythm, lower extremities without edema   Abdomen: soft, NTND, no masses, no guarding  Back: stable,  no abnormal curvature of the spine  Skin: warm and dry, no rashes, lesions, or ulcers  Musculoskeletal: normal ROM, m. tone, no digital cyanosis, head normocephalic  Psych: normal mood and affect, alert and appropriately answers questions  : stable bladder, no urethral catheter    Labs:     No results found for: PSA  No results found for: PSA, PSADIA  Recent Labs     01/29/23  0451 01/28/23  1115   WBC 10.5 13.0*   HGB 12.1 13.7   HCT 35.2* 41.0   MCV 87.2 89.2    452*     No results found for: LABA1C  Lab Results   Component Value Date    LABMICR YES 01/28/2023    LDLCALC 134 (H) 09/11/2013    CREATININE 0.6 01/29/2023     Lab Results   Component Value Date     01/29/2023    K 3.8 01/29/2023     01/29/2023    CO2 21 01/29/2023    BUN 11 01/29/2023    CREATININE 0.6 01/29/2023    GLUCOSE 96 01/29/2023    CALCIUM 8.0 (L) 01/29/2023    PROT 6.2 (L) 01/29/2023    LABALBU 3.4 01/29/2023    BILITOT 0.4 01/29/2023    ALKPHOS 85 01/29/2023    AST 11 (L) 01/29/2023    ALT 13 01/29/2023    LABGLOM >60 01/29/2023    GFRAA >60 09/11/2013    AGRATIO 1.2 01/29/2023    GLOB 4 09/11/2013     Lab Results   Component Value Date    CREATININE 0.6 01/29/2023    CREATININE 0.6 01/28/2023    CREATININE 0.7 09/11/2013       Lab Results   Component Value Date/Time    APPEARANCE clear 06/09/2012 04:06 PM    COLORU Yellow 01/28/2023 01:16 PM    NITRU POSITIVE 01/28/2023 01:16 PM    GLUCOSEU Negative 01/28/2023 01:16 PM    GLUCOSEU NEGATIVE 03/07/2011 09:45 AM    KETUA 15 01/28/2023 01:16 PM    UROBILINOGEN 0.2 01/28/2023 01:16 PM    BILIRUBINUR Negative 01/28/2023 01:16 PM    BILIRUBINUR negative 06/09/2012 04:06 PM    BILIRUBINUR NEGATIVE 03/07/2011 09:45 AM     Radiology: \"Imaging was independently reviewed by myself and I agree with the radiology interpretation except as noted above\"  3mm ureteral stone.      Assessment:  Right hydronephrosis secondary to obstruction from ureteral calculi, as described above  Flank pain and nausea    Plan:   - trial of stone passage  - call asap if fever/chills change in symptoms or return to ER  - oral hydration, 3L of fluid per day  - flomax 0.4mg qday - can help pass stone and help with stone related pain  - Prn toradol and narcotics for pain  -cipro abx on dc     Misty Carver MD  Office: [phone]

## 2023-01-29 NOTE — DISCHARGE SUMMARY
Name:  Damian Borges  Room:  /2812-84  MRN:    [id number]    Discharge Summary      This discharge summary is in conjunction with a complete physical exam done on the day of discharge. Discharging Physician: Trisha Mulligan MD      Admit: 1/28/2023  Discharge:  1/29/2023     Diagnoses this Admission    Principal Problem:    Kidney stone  Active Problems:    Hypokalemia    Leucocytosis    Epiploic appendagitis    Hyperlipidemia  Resolved Problems:    * No resolved hospital problems. *      Procedures (Please Review Full Report for Details)  none    Consults    IP CONSULT TO UROLOGY  IP CONSULT TO UROLOGY      HPI:    40 y.o. female who presented to the hospital with a chief complaint of acute onset left-sided abdominal pain. The pain was a 10 out of 10 and was radiating down to her groin. She presents to the emergency department as the pain persisted. In the emergency department a CT scan that was obtained revealed a 3 mm stone with hydronephrosis on the left. She will be admitted for urology evaluation in the a.m. Physical Exam at Discharge:  /74   Pulse 81   Temp 98 °F (36.7 °C) (Oral)   Resp 18   Ht 5' 8\" (1.727 m)   Wt 199 lb 8 oz (90.5 kg)   SpO2 94%   BMI 30.33 kg/m²     See progress note      Hospital Course    Ureteral calculus  -3 mm proximal left ureter with mild hydronephrosis  -Pyelonephritis   -admitted for further management.  -urology consulted  -NPO, IVF's, started diet. Send home with Ceftin and strainer. Urology follow up. Urology has cleared patient for discharge.  -added Flomax  -pain control: Toradol, Dilaudid  -antiemetics prn  -Rocephin D#2  -urine cx- E coli. Discharge with Ceftin.      Possible epiploic appendagitis  -discussed with general surgery  -no pain     Hypokalemia  -improved     Leukocytosis  -due to above  -improved with IVF's, antibiotics     Depression  -cont Prozac     Tobacco Dependence  -Recommended cessation       CBC:   Recent Labs 01/28/23  1115 01/29/23  0451   WBC 13.0* 10.5   HGB 13.7 12.1   HCT 41.0 35.2*   MCV 89.2 87.2   * 374     BMP:   Recent Labs     01/28/23  1115 01/29/23  0451    137   K 3.4* 3.8    107   CO2 20* 21   BUN 10 11   CREATININE 0.6 0.6     LIVER PROFILE:   Recent Labs     01/28/23  1115 01/29/23  0451   AST 13* 11*   ALT 9* 13   LIPASE 16.0  --    BILITOT 0.4 0.4   ALKPHOS 90 85     PT/INR: No results for input(s): PROTIME, INR in the last 72 hours. APTT: No results for input(s): APTT in the last 72 hours. UA:  Recent Labs     01/28/23  1316   COLORU Yellow   PHUR 7.5  7.5   WBCUA 10-20*   RBCUA *   BACTERIA 2+*   CLARITYU Clear   SPECGRAV 1.010   LEUKOCYTESUR TRACE*   UROBILINOGEN 0.2   BILIRUBINUR Negative   BLOODU LARGE*   GLUCOSEU Negative         CT ABDOMEN PELVIS W IV CONTRAST Additional Contrast? None   Preliminary Result   1. 3 mm calculus in the proximal left ureter with mild upstream   hydronephrosis. Subtle area of hypoenhancement in the lower pole of the left   kidney which could reflect an underlying cyst however focal pyelonephritis is   not excluded. 2. 9 mm fat-containing lesion in the left lower quadrant anterior to the   proximal sigmoid colon with surrounding inflammatory changes most suggestive   of epiploic appendagitis. Differential considerations include omental   infarct. Less likely consideration includes an acute uncomplicated   diverticulitis. 3. 2.0 cm dominant follicle in the left ovary. US PELVIS COMPLETE NON-OB TRANSABDOMINAL AND TRANSVAGINAL   Preliminary Result   Unremarkable uterus. Nonvisualization of the ovaries.                 Discharge Medications     Medication List        START taking these medications      cefUROXime 500 MG tablet  Commonly known as: CEFTIN  Take 1 tablet by mouth 2 times daily for 7 days     tamsulosin 0.4 MG capsule  Commonly known as: FLOMAX  Take 1 capsule by mouth daily  Start taking on: January 30, 2023 CONTINUE taking these medications      FLUoxetine 40 MG capsule  Commonly known as: PROzac     vitamin D 1.25 MG (28055 UT) Caps capsule  Commonly known as: ERGOCALCIFEROL  Take 1 capsule by mouth twice a week. Where to Get Your Medications        These medications were sent to [facility] [id number] [facility], [address] [place] [address], [address] [place] [address]      Phone: [phone]   cefUROXime 500 MG tablet  tamsulosin 0.4 MG capsule           Discharge Condition/Location: Stable    Follow Up: Follow up with PCP.         Rohan Magaña MD 1/29/2023 4:52 PM

## 2023-01-29 NOTE — DISCHARGE INSTRUCTIONS
Kidney Stone: Care Instructions  Your Care Instructions     Kidney stones are formed when salts, minerals, and other substances normally found in the urine clump together. They can be as small as grains of sand or, rarely, as large as golf balls. While the stone is traveling through the ureter, which is the tube that carries urine from the kidney to the bladder, you will probably feel pain. The pain may be mild or very severe. You may also have some blood in your urine. As soon as the stone reaches the bladder, any intense pain should go away. If a stone is too large to pass on its own, you may need a medical procedure to help you pass the stone. The doctor has checked you carefully, but problems can develop later. If you notice any problems or new symptoms, get medical treatment right away. Follow-up care is a key part of your treatment and safety. Be sure to make and go to all appointments, and call your doctor if you are having problems. It's also a good idea to know your test results and keep a list of the medicines you take. How can you care for yourself at home? Drink plenty of fluids. If you have kidney, heart, or liver disease and have to limit fluids, talk with your doctor before you increase the amount of fluids you drink. Take pain medicines exactly as directed. Call your doctor if you think you are having a problem with your medicine. If the doctor gave you a prescription medicine for pain, take it as prescribed. If you are not taking a prescription pain medicine, ask your doctor if you can take an over-the-counter medicine. Read and follow all instructions on the label. Your doctor may ask you to strain your urine so that you can collect your kidney stone when it passes. You can use a kitchen strainer or a tea strainer to catch the stone. Store it in a plastic bag until you see your doctor again. Preventing future kidney stones  Some changes in your diet may help prevent kidney stones. Depending on the cause of your stones, your doctor may recommend that you:  Drink plenty of fluids. If you have kidney, heart, or liver disease and have to limit fluids, talk with your doctor before you increase the amount of fluids you drink. Limit coffee, tea, and alcohol. Also avoid grapefruit juice. Do not take more than the recommended daily dose of vitamins C and D. Avoid antacids such as Gaviscon, Maalox, Mylanta, or Tums. Limit the amount of salt (sodium) in your diet. Eat a balanced diet that is not too high in protein. Limit foods that are high in a substance called oxalate, which can cause kidney stones. These foods include dark green vegetables, rhubarb, chocolate, wheat bran, nuts, cranberries, and beans. When should you call for help? Call your doctor now or seek immediate medical care if:    You cannot keep down fluids. Your pain gets worse. You have a fever or chills. You have new or worse pain in your back just below your rib cage (the flank area). You have new or more blood in your urine. Watch closely for changes in your health, and be sure to contact your doctor if:    You do not get better as expected. Where can you learn more? Go to http://www.woods.com/ and enter H042 to learn more about \"Kidney Stone: Care Instructions. \"  Current as of: June 16, 2022               Content Version: 13.5  © 2006-2022 [employer]. Care instructions adapted under license by [facility] ([facility]). If you have questions about a medical condition or this instruction, always ask your healthcare professional. [address] any warranty or liability for your use of this information.

## 2023-01-29 NOTE — PROGRESS NOTES
DC instructions reviewed with a verbal understanding. Patient was sent home with a urine strainer and measuring device for toilet. Patients iv removed with no complications. Patient refused wheelchair and walked out of facility with a steady gait. Patient denied any needs prior to leaving.

## 2023-01-29 NOTE — PROGRESS NOTES
Patient received to PCU in stable condition and denies any needs at this time. Patient has been oriented to room and call light.

## 2023-01-29 NOTE — PROGRESS NOTES
4 Eyes Skin Assessment     The patient is being assess for   Admission    I agree that 2 RN's have performed a thorough Head to Toe Skin Assessment on the patient. ALL assessment sites listed below have been assessed. Areas assessed for pressure by both nurses:   [x]   Head, Face, and Ears   [x]   Shoulders, Back, and Chest, Abdomen  [x]   Arms, Elbows, and Hands   [x]   Coccyx, Sacrum, and Ischium  [x]   Legs, Feet, and Heels        Patients skin is unremarkable. Skin Assessed Under all Medical Devices by both nurses:  none               All Mepilex Borders were peeled back and area peeked at by both nurses:  Yes  Please list where Mepilex Borders are located:  n/a             **SHARE this note so that the co-signing nurse is able to place an eSignature**    Co-signer eSignature: Electronically signed by Sergei Lovelace RN on 1/28/23 at 7:49 PM EST    Does the Patient have Skin Breakdown related to pressure?   No              Colin Prevention initiated:  No   Wound Care Orders initiated:  No      [facility] nurse consulted for Pressure Injury (Stage 3,4, Unstageable, DTI, NWPT, Complex wounds)and New or Established Ostomies:  No      Primary Nurse eSignature: Electronically signed by Jana La RN on 1/28/23 at 7:35 PM EST

## 2023-01-29 NOTE — PROGRESS NOTES
Progress Note    Admit Date:  1/28/2023    40year old female presented with c/o left flank pain, radiating to his groin. Found to have 3 mm stone left proximal ureter with hydronephrosis. Admitted for further management/care. Urology consulted. Subjective:  Ms. Ciro Toledo is denying any pain. No dysuria. No fever or chills. Objective:   Patient Vitals for the past 4 hrs:   BP Temp Temp src Pulse Resp SpO2   01/29/23 0737 114/75 97.8 °F (36.6 °C) Oral 85 16 96 %          Intake/Output Summary (Last 24 hours) at 1/29/2023 0826  Last data filed at 1/29/2023 5325  Gross per 24 hour   Intake 904.08 ml   Output 200 ml   Net 704.08 ml       Physical Exam:  Gen: No distress. Alert. Eyes: PERRL. No sclera icterus. No conjunctival injection. ENT: No discharge. Pharynx clear. Neck:  Trachea midline. Resp: No accessory muscle use. No crackles. No wheezes. No rhonchi. CV: Regular rate. Regular rhythm. No murmur. No rub. No edema. Capillary Refill: Brisk,< 3 seconds   Peripheral Pulses: +2 palpable, equal bilaterally   GI: Non-tender. Non-distended. Normal bowel sounds. No hernia. Left flank pain  Skin: Warm and dry. No nodule on exposed extremities. No rash on exposed extremities. M/S: No cyanosis. No joint deformity. No clubbing. Neuro: Awake. Grossly nonfocal    Psych: Oriented x 3. No anxiety or agitation      Data:  CBC:   Recent Labs     01/28/23  1115 01/29/23  0451   WBC 13.0* 10.5   HGB 13.7 12.1   HCT 41.0 35.2*   MCV 89.2 87.2   * 374     BMP:   Recent Labs     01/28/23  1115 01/29/23  0451    137   K 3.4* 3.8    107   CO2 20* 21   BUN 10 11   CREATININE 0.6 0.6     LIVER PROFILE:   Recent Labs     01/28/23  1115 01/29/23  0451   AST 13* 11*   ALT 9* 13   LIPASE 16.0  --    BILITOT 0.4 0.4   ALKPHOS 90 85     PT/INR: No results for input(s): PROTIME, INR in the last 72 hours.     CULTURES  Urine: pending  COVID/flu: negative  Wet prep: negative  Chlamydia/Gonorrhea: pending RADIOLOGY  CT ABDOMEN PELVIS W IV CONTRAST Additional Contrast? None   Preliminary Result   1. 3 mm calculus in the proximal left ureter with mild upstream   hydronephrosis. Subtle area of hypoenhancement in the lower pole of the left   kidney which could reflect an underlying cyst however focal pyelonephritis is   not excluded. 2. 9 mm fat-containing lesion in the left lower quadrant anterior to the   proximal sigmoid colon with surrounding inflammatory changes most suggestive   of epiploic appendagitis. Differential considerations include omental   infarct. Less likely consideration includes an acute uncomplicated   diverticulitis. 3. 2.0 cm dominant follicle in the left ovary. US PELVIS COMPLETE NON-OB TRANSABDOMINAL AND TRANSVAGINAL   Preliminary Result   Unremarkable uterus. Nonvisualization of the ovaries. David Hernandez MD have reviewed the chart on [facility] and personally interviewed and examined patient, reviewed the data (labs and imaging) and after discussion with my PA formulated the plan. Agree with note with the following edits. HPI:     Admitted for kidney stone. Denies any flank pain. No fever or chills. No hematuria. No prior history of kidney stones. I reviewed the patient's Past Medical History, Past Surgical History, Medications, and Allergies. Physical exam:    /75   Pulse 85   Temp 97.8 °F (36.6 °C) (Oral)   Resp 16   Ht 5' 8\" (1.727 m)   Wt 199 lb 8 oz (90.5 kg)   SpO2 96%   BMI 30.33 kg/m²     Gen: No distress. Alert. Eyes: PERRL. No sclera icterus. No conjunctival injection. ENT: No discharge. Pharynx clear. Neck: Trachea midline. Normal thyroid. Resp: No accessory muscle use. No crackles. No wheezes. No rhonchi. No dullness on percussion. CV: Regular rate. Regular rhythm. No murmur or rub. No edema. GI: Non-tender. Non-distended. No masses. No organomegaly. Normal bowel sounds. No hernia.    Skin: Warm and dry. No nodule on exposed extremities.           Assessment/Plan:  Ureteral calculus  -3 mm proximal left ureter with mild hydronephrosis  -Pyelonephritis   -admitted for further management.  -urology consulted  -NPO, IVF's  -added Flomax  -pain control: Toradol, Dilaudid  -antiemetics prn  -Rocephin D#2  -urine cx pending    Possible epiploic appendagitis  -discussed with general surgery  -no pain    Hypokalemia  -improved    Leukocytosis  -due to above  -improved with IVF's, antibiotics    Depression  -cont Prozac    Tobacco Dependence  -Recommended cessation    DVT Prophylaxis: Lovenox  Diet: Diet NPO  Code Status: Full Code    Alvenia Fee FNP-C  1/29/2023      Modesto Pierson MD 1/29/2023 11:35 AM

## 2023-01-29 NOTE — H&P
Hospital Medicine History & Physical      PCP: YONATAN Rosenberg CNP    Date of Admission: 2023    Date of Service: Pt seen/examined on 2023    Chief Complaint: Abdominal pain      History Of Present Illness:    40 y.o. female who presented to the hospital with a chief complaint of acute onset left-sided abdominal pain. The pain was a 10 out of 10 and was radiating down to her groin. She presents to the emergency department as the pain persisted. In the emergency department a CT scan that was obtained revealed a 3 mm stone with hydronephrosis on the left. She will be admitted for urology evaluation in the a.m. Past Medical History:          Diagnosis Date    Depression     History of anemia age 20's    Hyperlipidemia 2013    Kidney stone 2023    Migraines     Overweight(278.02)     weight is 188 lbs (), goal = 159 lbs    Vitamin D deficiency 2013       Past Surgical History:          Procedure Laterality Date    APPENDECTOMY  2007     SECTION  ,     WISDOM TOOTH EXTRACTION  2013       Medications Prior to Admission:      Prior to Admission medications    Medication Sig Start Date End Date Taking? Authorizing Provider   FLUoxetine (PROZAC) 40 MG capsule Take 40 mg by mouth daily   Yes Historical Provider, MD   vitamin D (ERGOCALCIFEROL) 64058 UNITS CAPS capsule Take 1 capsule by mouth twice a week. 13   Linda Morales MD       Allergies:  Patient has no known allergies. Social History:      TOBACCO:   reports that she has been smoking cigarettes. She has never used smokeless tobacco.  ETOH:   reports no history of alcohol use.       Family History:          Problem Relation Age of Onset    Depression Mother     High Blood Pressure Mother     Other Mother         Lupus    Other Mother         GERD    Anxiety Disorder Mother     Depression Brother 25    Alcohol Abuse Brother 25    Anxiety Disorder Brother     Arthritis Maternal Grandmother     Cancer Maternal Grandmother 62         of Pancreatic Cancer    Arthritis Maternal Grandfather     Lung Cancer Maternal Grandfather 72         of Lung Cancer    Alcohol Abuse Maternal Grandfather     Arthritis Paternal Grandmother     Arthritis Paternal Grandfather     Lung Cancer Paternal Grandfather 76         of Lung Cancer    Learning Disabilities Brother 5    Anxiety Disorder Father     Depression Father        REVIEW OF SYSTEMS:   Constitutional:  Negative for fever,chills or night sweats  ENT:  Negative for rhinorrhea, epistaxis, hoarseness, sore throat. Respiratory: Negative for SOB or wheezing   Cardiovascular:   Negative for  chest pain, palpitations   Gastrointestinal: Positive for left suprapubic pain  Genitourinary: Positive for abdominal pain  Hematologic/Lymphatic:  Negative for  bleeding tendency, easy bruising  Musculoskeletal:  Negative for myalgias and arthralgias  Neurologic:  Negative for  confusion,dysarthria. Skin:  Negative for itching,rash  Psychiatric:  Negative for depression,anxiety, agitation. Endocrine:  Negative for polydipsia,polyuria,heat /cold intolerance. PHYSICAL EXAM:  /74   Pulse 69   Temp 98.1 °F (36.7 °C) (Oral)   Resp 16   Ht 5' 8\" (1.727 m)   Wt 200 lb (90.7 kg)   SpO2 99%   BMI 30.41 kg/m²   General appearance:  No apparent distress, appears stated age and cooperative. HEENT:  Normal cephalic, atraumatic without obvious deformity. Pupils equal, round, and reactive to light. Extra ocular muscles intact. Conjunctivae/corneas clear. Neck: Supple, with full range of motion. No jugular venous distention. Trachea midline. Respiratory:  Normal respiratory effort. Clear to auscultation, bilaterally without Rales/Wheezes/Rhonchi. Cardiovascular:  Regular rate and rhythm with normal S1/S2 without murmurs, rubs or gallops. Abdomen: Soft, non-tender, non-distended with normal bowel sounds.   Musculoskeletal:  No clubbing, cyanosis or edema bilaterally. Full range of motion without deformity. Skin: Skin color, texture, turgor normal.  No rashes or lesions. Neurologic:  Neurovascularly intact without any focal sensory/motor deficits. Cranial nerves: II-XII intact, grossly non-focal.  Psychiatric:  Alert and oriented, thought content appropriate, normal insight  Capillary Refill: Brisk,< 3 seconds   Peripheral Pulses: +2 palpable, equal bilaterally       Labs:   Recent Labs     01/28/23  1115   WBC 13.0*   HGB 13.7   HCT 41.0   *     Recent Labs     01/28/23  1115      K 3.4*      CO2 20*   BUN 10   CREATININE 0.6   CALCIUM 8.9     Recent Labs     01/28/23  1115   AST 13*   ALT 9*   BILITOT 0.4   ALKPHOS 90       Urinalysis:    Lab Results   Component Value Date/Time    NITRU POSITIVE 01/28/2023 01:16 PM    WBCUA 10-20 01/28/2023 01:16 PM    BACTERIA 2+ 01/28/2023 01:16 PM    RBCUA  01/28/2023 01:16 PM    BLOODU LARGE 01/28/2023 01:16 PM    SPECGRAV 1.010 01/28/2023 01:16 PM    GLUCOSEU Negative 01/28/2023 01:16 PM    GLUCOSEU NEGATIVE 03/07/2011 09:45 AM       Radiology:   CT ABDOMEN PELVIS W IV CONTRAST Additional Contrast? None   Preliminary Result   1. 3 mm calculus in the proximal left ureter with mild upstream   hydronephrosis. Subtle area of hypoenhancement in the lower pole of the left   kidney which could reflect an underlying cyst however focal pyelonephritis is   not excluded. 2. 9 mm fat-containing lesion in the left lower quadrant anterior to the   proximal sigmoid colon with surrounding inflammatory changes most suggestive   of epiploic appendagitis. Differential considerations include omental   infarct. Less likely consideration includes an acute uncomplicated   diverticulitis. 3. 2.0 cm dominant follicle in the left ovary. US PELVIS COMPLETE NON-OB TRANSABDOMINAL AND TRANSVAGINAL   Preliminary Result   Unremarkable uterus. Nonvisualization of the ovaries. ASSESSMENT:  Left nephrolithiasis  Hydronephrosis on the left  Hyperlipidemia  Pyuria  Depression  Abnormal CT abdomen    PLAN:  Admit to [facility]  Pain control, Flomax  Urology consult, IV fluids  Empiric antibiotics with Rocephin  Resume Prozac    DVT Prophylaxis: Lovenox  Diet: ADULT DIET; Regular  Diet NPO  Code Status: Full Code    Dispo -admit to [facility]      Thank you for the opportunity to be involved in this patient's care.       (Please note that portions of this note were completed with a voice recognition program. Efforts were made to edit the dictations but occasionally words are mis-transcribed.)

## 2023-01-30 LAB
ORGANISM: ABNORMAL
URINE CULTURE, ROUTINE: ABNORMAL

## 2023-01-31 LAB
C TRACH DNA GENITAL QL NAA+PROBE: NEGATIVE
N. GONORRHOEAE DNA: NEGATIVE

## 2023-03-15 ENCOUNTER — HOSPITAL ENCOUNTER (OUTPATIENT)
Dept: CT IMAGING | Age: 43
Discharge: HOME | End: 2023-03-15
Payer: COMMERCIAL

## 2023-03-15 DIAGNOSIS — E27.8 ABNORMALITY OF CORTISOL-BINDING GLOBULIN (HCC): ICD-10-CM

## 2023-03-15 DIAGNOSIS — D44.12 NEOPLASM OF UNCERTAIN BEHAVIOR OF LEFT ADRENAL GLAND: ICD-10-CM

## 2023-03-15 DIAGNOSIS — N20.0 URIC ACID NEPHROLITHIASIS: ICD-10-CM

## 2023-03-15 PROCEDURE — 74170 CT ABD WO CNTRST FLWD CNTRST: CPT

## 2023-03-15 PROCEDURE — 6360000004 HC RX CONTRAST MEDICATION: Performed by: UROLOGY

## 2023-03-15 RX ADMIN — IOPAMIDOL 75 ML: 755 INJECTION, SOLUTION INTRAVENOUS at 07:16

## 2023-03-22 ENCOUNTER — HOSPITAL ENCOUNTER (OUTPATIENT)
Age: 43
Discharge: HOME | End: 2023-03-22
Payer: COMMERCIAL

## 2023-03-22 PROCEDURE — 84132 ASSAY OF SERUM POTASSIUM: CPT

## 2023-03-22 PROCEDURE — 82024 ASSAY OF ACTH: CPT

## 2023-03-22 PROCEDURE — 36415 COLL VENOUS BLD VENIPUNCTURE: CPT

## 2023-03-22 PROCEDURE — 84155 ASSAY OF PROTEIN SERUM: CPT

## 2023-03-22 PROCEDURE — 82530 CORTISOL FREE: CPT

## 2023-03-22 PROCEDURE — 82626 DEHYDROEPIANDROSTERONE: CPT

## 2023-03-22 PROCEDURE — 82088 ASSAY OF ALDOSTERONE: CPT

## 2023-03-22 PROCEDURE — 83835 ASSAY OF METANEPHRINES: CPT

## 2023-03-22 PROCEDURE — 84244 ASSAY OF RENIN: CPT

## 2023-03-22 PROCEDURE — 82384 ASSAY THREE CATECHOLAMINES: CPT

## 2023-03-23 LAB
POTASSIUM SERPL-SCNC: 4.4 MMOL/L (ref 3.5–5.1)
PROT SERPL-MCNC: 7.7 G/DL (ref 6.4–8.2)

## 2023-03-25 LAB
ANNOTATION COMMENT IMP: NORMAL
METANEPHS SERPL-SCNC: <0.1 NMOL/L (ref 0–0.49)
NORMETANEPHRINE SERPL-SCNC: 0.43 NMOL/L (ref 0–0.89)
RENIN PLAS-CCNC: 1.4 NG/ML/HR

## 2023-03-26 LAB — DHEA SERPL-MCNC: 1.64 NG/ML (ref 0.63–4.7)

## 2023-03-27 LAB
CATECHOLS PLAS-IMP: ABNORMAL
DOPAMINE SERPL-MCNC: <20 PG/ML (ref 0–20)
EPINEPH PLAS-MCNC: <10 PG/ML (ref 10–200)
NOREPINEPH PLAS-MCNC: 297 PG/ML (ref 80–520)

## 2023-03-29 LAB
ACTH PLAS-MCNC: 20 PG/ML (ref 6–58)
CORTIS F SERPL-MCNC: 0.26 UG/DL

## 2023-03-30 LAB — ALDOST SERPL-MCNC: 5.2 NG/DL

## 2023-08-16 ENCOUNTER — HOSPITAL ENCOUNTER (OUTPATIENT)
Dept: MAMMOGRAPHY | Age: 43
Discharge: HOME | End: 2023-08-16
Payer: COMMERCIAL

## 2023-08-16 DIAGNOSIS — Z12.31 SCREENING MAMMOGRAM, ENCOUNTER FOR: ICD-10-CM

## 2023-08-16 PROCEDURE — 77063 BREAST TOMOSYNTHESIS BI: CPT

## 2023-08-18 ENCOUNTER — OFFICE VISIT (OUTPATIENT)
Dept: ORTHOPEDIC SURGERY | Age: 43
End: 2023-08-18
Payer: COMMERCIAL

## 2023-08-18 VITALS — BODY MASS INDEX: 30.16 KG/M2 | WEIGHT: 199 LBS | HEIGHT: 68 IN

## 2023-08-18 DIAGNOSIS — M54.2 CERVICAL PAIN: Primary | ICD-10-CM

## 2023-08-18 DIAGNOSIS — G43.809 OTHER MIGRAINE WITHOUT STATUS MIGRAINOSUS, NOT INTRACTABLE: ICD-10-CM

## 2023-08-18 PROCEDURE — G8427 DOCREV CUR MEDS BY ELIG CLIN: HCPCS | Performed by: PHYSICIAN ASSISTANT

## 2023-08-18 PROCEDURE — 99204 OFFICE O/P NEW MOD 45 MIN: CPT | Performed by: PHYSICIAN ASSISTANT

## 2023-08-18 PROCEDURE — G8417 CALC BMI ABV UP PARAM F/U: HCPCS | Performed by: PHYSICIAN ASSISTANT

## 2023-08-18 PROCEDURE — 4004F PT TOBACCO SCREEN RCVD TLK: CPT | Performed by: PHYSICIAN ASSISTANT

## 2023-08-18 SDOH — HEALTH STABILITY: PHYSICAL HEALTH: ON AVERAGE, HOW MANY DAYS PER WEEK DO YOU ENGAGE IN MODERATE TO STRENUOUS EXERCISE (LIKE A BRISK WALK)?: 2 DAYS

## 2023-08-18 SDOH — HEALTH STABILITY: PHYSICAL HEALTH: ON AVERAGE, HOW MANY MINUTES DO YOU ENGAGE IN EXERCISE AT THIS LEVEL?: 20 MIN

## 2023-08-18 NOTE — PROGRESS NOTES
New Patient: CERVICAL SPINE    Referring Provider:  No ref. provider found    CHIEF COMPLAINT:    Chief Complaint   Patient presents with    Neck Pain     cervical       HISTORY OF PRESENT ILLNESS:   Ms. Paul Carbajal is a pleasant 44 y.o. old female here for evaluation regarding her neck pain. She states the pain began insidiously over 20 years ago. Patient states that as a teenager she did sustain a concussion and since then she has had continued pain at the base of her skull which she states the pain then radiates to her head and causes a headache. She states that she has had headaches on a daily basis for the past 20 years. She states that her current pain in her neck is a 6/10 mainly on the right side with radiation into her shoulders which she rates at 2/10. She does state that she has very seldom numbness and tingling into her fingertips. She has had chiropractic care but she has had no other treatments. She does state that the pain is constant does interfere with her sleep at night. She denies any present medication for her symptoms. She denies any bowel or bladder dysfunction, saddle anesthesia, or lower extremity symptoms. Pain Assessment  Location of Pain: Neck  Location Modifiers: Other (Comment)  Severity of Pain: 6  Quality of Pain: Other (Comment)  Duration of Pain: Other (Comment)  Frequency of Pain: Other (Comment)  Aggravating Factors: Other (Comment)  Relieving Factors: Other (Comment)]  Current/Past Treatment:   Physical Therapy: None  Chiropractic:  In the past with some benefit  Injection: None  Medications: None currently    Past Medical History:   Past Medical History:   Diagnosis Date    Depression     History of anemia age 20's    Hyperlipidemia 09/01/2013    Kidney stone 01/28/2023    Migraines     Overweight(278.02)     weight is 188 lbs (1/14), goal = 159 lbs    Vitamin D deficiency 09/01/2013        Past Surgical History:     Past Surgical History:   Procedure Laterality Date